# Patient Record
Sex: MALE | Race: WHITE | NOT HISPANIC OR LATINO
[De-identification: names, ages, dates, MRNs, and addresses within clinical notes are randomized per-mention and may not be internally consistent; named-entity substitution may affect disease eponyms.]

---
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
## 2020-01-02 ENCOUNTER — OTHER (OUTPATIENT)
Age: 20
End: 2020-01-02

## 2020-01-03 ENCOUNTER — LABORATORY RESULT (OUTPATIENT)
Age: 20
End: 2020-01-03

## 2020-01-07 LAB
25(OH)D3 SERPL-MCNC: 29 NG/ML
APPEARANCE: CLEAR
BASOPHILS # BLD AUTO: 0.06 K/UL
BASOPHILS NFR BLD AUTO: 0.8 %
BILIRUBIN URINE: NEGATIVE
BLOOD URINE: ABNORMAL
CHOLEST SERPL-MCNC: 120 MG/DL
COLOR: NORMAL
EOSINOPHIL # BLD AUTO: 0.31 K/UL
EOSINOPHIL NFR BLD AUTO: 3.9 %
GLUCOSE QUALITATIVE U: NEGATIVE
HCT VFR BLD CALC: 44.8 %
HGB BLD-MCNC: 15.1 G/DL
IMM GRANULOCYTES NFR BLD AUTO: 0.4 %
KETONES URINE: NEGATIVE
LEUKOCYTE ESTERASE URINE: NEGATIVE
LYMPHOCYTES # BLD AUTO: 3.32 K/UL
LYMPHOCYTES NFR BLD AUTO: 41.9 %
MAN DIFF?: NORMAL
MCHC RBC-ENTMCNC: 31 PG
MCHC RBC-ENTMCNC: 33.7 GM/DL
MCV RBC AUTO: 92 FL
MONOCYTES # BLD AUTO: 0.67 K/UL
MONOCYTES NFR BLD AUTO: 8.4 %
NEUTROPHILS # BLD AUTO: 3.54 K/UL
NEUTROPHILS NFR BLD AUTO: 44.6 %
NITRITE URINE: NEGATIVE
PH URINE: 6
PLATELET # BLD AUTO: 213 K/UL
PROTEIN URINE: NEGATIVE
RBC # BLD: 4.87 M/UL
RBC # FLD: 13.1 %
SPECIFIC GRAVITY URINE: 1.02
UROBILINOGEN URINE: NORMAL
WBC # FLD AUTO: 7.93 K/UL

## 2020-08-12 ENCOUNTER — APPOINTMENT (OUTPATIENT)
Dept: PEDIATRICS | Facility: CLINIC | Age: 20
End: 2020-08-12
Payer: COMMERCIAL

## 2020-08-12 VITALS
HEIGHT: 70.5 IN | TEMPERATURE: 207.86 F | WEIGHT: 188.13 LBS | DIASTOLIC BLOOD PRESSURE: 77 MMHG | OXYGEN SATURATION: 98 % | HEART RATE: 67 BPM | BODY MASS INDEX: 26.63 KG/M2 | SYSTOLIC BLOOD PRESSURE: 121 MMHG

## 2020-08-12 DIAGNOSIS — Z87.898 PERSONAL HISTORY OF OTHER SPECIFIED CONDITIONS: ICD-10-CM

## 2020-08-12 DIAGNOSIS — E56.9 VITAMIN DEFICIENCY, UNSPECIFIED: ICD-10-CM

## 2020-08-12 DIAGNOSIS — Z87.09 PERSONAL HISTORY OF OTHER DISEASES OF THE RESPIRATORY SYSTEM: ICD-10-CM

## 2020-08-12 DIAGNOSIS — Z87.798 PERSONAL HISTORY OF OTHER (CORRECTED) CONGENITAL MALFORMATIONS: ICD-10-CM

## 2020-08-12 DIAGNOSIS — Z87.448 PERSONAL HISTORY OF OTHER DISEASES OF URINARY SYSTEM: ICD-10-CM

## 2020-08-12 PROCEDURE — 96127 BRIEF EMOTIONAL/BEHAV ASSMT: CPT

## 2020-08-12 PROCEDURE — 96160 PT-FOCUSED HLTH RISK ASSMT: CPT

## 2020-08-12 PROCEDURE — 99395 PREV VISIT EST AGE 18-39: CPT

## 2020-08-12 NOTE — HISTORY OF PRESENT ILLNESS
[Up to date] : Up to date [Eats meals with family] : eats meals with family [Has family members/adults to turn to for help] : has family members/adults to turn to for help [Is permitted and is able to make independent decisions] : Is permitted and is able to make independent decisions [Grade: ____] : Grade: [unfilled] [Normal Performance] : normal performance [Normal Behavior/Attention] : normal behavior/attention [Normal Homework] : normal homework [Eats regular meals including adequate fruits and vegetables] : eats regular meals including adequate fruits and vegetables [Calcium source] : calcium source [Drinks non-sweetened liquids] : drinks non-sweetened liquids  [At least 1 hour of physical activity a day] : at least 1 hour of physical activity a day [Has friends] : has friends [Screen time (except homework) less than 2 hours a day] : screen time (except homework) less than 2 hours a day [Has interests/participates in community activities/volunteers] : has interests/participates in community activities/volunteers. [Exposure to electronic nicotine delivery system] : exposure to electronic nicotine delivery system [Exposure to tobacco] : exposure to tobacco [Exposure to drugs] : exposure to drugs [Drinks alcohol] : drinks alcohol [Exposure to alcohol] : exposure to alcohol [Uses safety belts/safety equipment] : uses safety belts/safety equipment  [No] : No cigarette smoke exposure [Has peer relationships free of violence] : has peer relationships free of violence [Has ways to cope with stress] : has ways to cope with stress [Displays self-confidence] : displays self-confidence [With Teen] : teen [Sleep Concerns] : no sleep concerns [Has concerns about body or appearance] : does not have concerns about body or appearance [Uses electronic nicotine delivery system] : does not use electronic nicotine delivery system [Uses tobacco] : does not use tobacco [Impaired/distracted driving] : no impaired/distracted driving [Uses drugs] : does not use drugs  [Always] : Condom use: always [Yes] : Patient has had sexual intercourse. 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

## 2020-08-12 NOTE — PHYSICAL EXAM
[Alert] : alert [No Acute Distress] : no acute distress [Normocephalic] : normocephalic [Clear tympanic membranes with bony landmarks and light reflex present bilaterally] : clear tympanic membranes with bony landmarks and light reflex present bilaterally  [EOMI Bilateral] : EOMI bilateral [Pink Nasal Mucosa] : pink nasal mucosa [Nonerythematous Oropharynx] : nonerythematous oropharynx [Clear to Auscultation Bilaterally] : clear to auscultation bilaterally [Supple, full passive range of motion] : supple, full passive range of motion [No Palpable Masses] : no palpable masses [Normal S1, S2 audible] : normal S1, S2 audible [Regular Rate and Rhythm] : regular rate and rhythm [No Murmurs] : no murmurs [+2 Femoral Pulses] : +2 femoral pulses [Soft] : soft [NonTender] : non tender [Non Distended] : non distended [No Hepatomegaly] : no hepatomegaly [No Splenomegaly] : no splenomegaly [Normoactive Bowel Sounds] : normoactive bowel sounds [No Abnormal Lymph Nodes Palpated] : no abnormal lymph nodes palpated [No Gait Asymmetry] : no gait asymmetry [Normal Muscle Tone] : normal muscle tone [No pain or deformities with palpation of bone, muscles, joints] : no pain or deformities with palpation of bone, muscles, joints [Straight] : straight [Cranial Nerves Grossly Intact] : cranial nerves grossly intact [+2 Patella DTR] : +2 patella DTR [No Rash or Lesions] : no rash or lesions

## 2020-08-12 NOTE — DISCUSSION/SUMMARY
[FreeTextEntry1] : Discussed at length 20- 25 minutes the dangers of alcohol drugs and weed. Tobacco is a major health issue and E-cigarettes are no better nor is vapeing. \par He understands that alcohol and weed are the gate way drugs and many that start in Middle School or High School with alcohol and weed end up trying other substances. We discussed sex and the importance of protecting from STDs and unwanted pregnancies with condoms. Although the best safeguard is abstinence.\par Questions were answered re:STDs drugs and alcohol. I advised that addiction is a disease and that it runs in families and if it is in the Family history one must be especially cautious about any alcohol drugs or high risk behavior\par He does cycle with Creatine and when he is off he notices that he drops 6 - 8 lbs I mentioned that Creatine will add water to the muscle but not strength\par

## 2020-09-03 LAB
25(OH)D3 SERPL-MCNC: 63.3 NG/ML
APPEARANCE: CLEAR
BASOPHILS # BLD AUTO: 0.07 K/UL
BASOPHILS NFR BLD AUTO: 0.8 %
BILIRUBIN URINE: NEGATIVE
BLOOD URINE: ABNORMAL
CHOLEST SERPL-MCNC: 112 MG/DL
COLOR: YELLOW
EOSINOPHIL # BLD AUTO: 0.24 K/UL
EOSINOPHIL NFR BLD AUTO: 2.8 %
GLUCOSE QUALITATIVE U: NEGATIVE
HCT VFR BLD CALC: 49.7 %
HGB BLD-MCNC: 16.2 G/DL
IMM GRANULOCYTES NFR BLD AUTO: 0.4 %
KETONES URINE: NEGATIVE
LEUKOCYTE ESTERASE URINE: NEGATIVE
LYMPHOCYTES # BLD AUTO: 2.62 K/UL
LYMPHOCYTES NFR BLD AUTO: 31 %
MAN DIFF?: NORMAL
MCHC RBC-ENTMCNC: 29.9 PG
MCHC RBC-ENTMCNC: 32.6 GM/DL
MCV RBC AUTO: 91.7 FL
MONOCYTES # BLD AUTO: 0.67 K/UL
MONOCYTES NFR BLD AUTO: 7.9 %
NEUTROPHILS # BLD AUTO: 4.83 K/UL
NEUTROPHILS NFR BLD AUTO: 57.1 %
NITRITE URINE: NEGATIVE
PH URINE: 6
PLATELET # BLD AUTO: 243 K/UL
PROTEIN URINE: NEGATIVE
RBC # BLD: 5.42 M/UL
RBC # FLD: 12.2 %
SARS-COV-2 IGG SERPL IA-ACNC: 0.09 INDEX
SARS-COV-2 IGG SERPL QL IA: NEGATIVE
SPECIFIC GRAVITY URINE: 1.03
UROBILINOGEN URINE: NORMAL
WBC # FLD AUTO: 8.46 K/UL

## 2021-07-07 ENCOUNTER — APPOINTMENT (OUTPATIENT)
Dept: PEDIATRICS | Facility: CLINIC | Age: 21
End: 2021-07-07
Payer: COMMERCIAL

## 2021-07-21 ENCOUNTER — APPOINTMENT (OUTPATIENT)
Dept: PEDIATRICS | Facility: CLINIC | Age: 21
End: 2021-07-21
Payer: COMMERCIAL

## 2021-07-21 VITALS
HEART RATE: 57 BPM | WEIGHT: 201.25 LBS | SYSTOLIC BLOOD PRESSURE: 122 MMHG | HEIGHT: 70.5 IN | DIASTOLIC BLOOD PRESSURE: 75 MMHG | TEMPERATURE: 98.2 F | BODY MASS INDEX: 28.49 KG/M2

## 2021-07-21 DIAGNOSIS — Z00.00 ENCOUNTER FOR GENERAL ADULT MEDICAL EXAMINATION W/OUT ABNORMAL FINDINGS: ICD-10-CM

## 2021-07-21 PROCEDURE — 99072 ADDL SUPL MATRL&STAF TM PHE: CPT

## 2021-07-21 PROCEDURE — 96127 BRIEF EMOTIONAL/BEHAV ASSMT: CPT

## 2021-07-21 PROCEDURE — 96160 PT-FOCUSED HLTH RISK ASSMT: CPT | Mod: 59

## 2021-07-21 PROCEDURE — 99395 PREV VISIT EST AGE 18-39: CPT

## 2021-07-21 NOTE — DISCUSSION/SUMMARY
[FreeTextEntry1] : Discussed at length 20- 25 minutes the dangers of alcohol drugs and weed. Tobacco is a major health issue and E-cigarettes are no better nor is vapeing. \par He understands that alcohol and weed are the gate way drugs and many that start in Middle School or High School with alcohol and weed end up trying other substances. We discussed sex and the importance of protecting from STDs and unwanted pregnancies with condoms. Although the best safeguard is abstinence.\par Questions were answered re:STDs drugs and alcohol. 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

## 2021-07-21 NOTE — HISTORY OF PRESENT ILLNESS
[Up to date] : Up to date [Eats meals with family] : eats meals with family [Has family members/adults to turn to for help] : has family members/adults to turn to for help [Is permitted and is able to make independent decisions] : Is permitted and is able to make independent decisions [Grade: ____] : Grade: [unfilled] [Normal Performance] : normal performance [Normal Behavior/Attention] : normal behavior/attention [Normal Homework] : normal homework [Eats regular meals including adequate fruits and vegetables] : eats regular meals including adequate fruits and vegetables [Drinks non-sweetened liquids] : drinks non-sweetened liquids  [Calcium source] : calcium source [Exposure to electronic nicotine delivery system] : exposure to electronic nicotine delivery system [Exposure to tobacco] : exposure to tobacco [Exposure to drugs] : exposure to drugs [Drinks alcohol] : drinks alcohol [Exposure to alcohol] : exposure to alcohol [No] : No cigarette smoke exposure [Uses safety belts/safety equipment] : uses safety belts/safety equipment  [Has peer relationships free of violence] : has peer relationships free of violence [Yes] : Patient has had sexual intercourse. [Has ways to cope with stress] : has ways to cope with stress [Displays self-confidence] : displays self-confidence [With Teen] : teen [Sleep Concerns] : no sleep concerns [Has concerns about body or appearance] : does not have concerns about body or appearance [Has friends] : has friends [At least 1 hour of physical activity a day] : at least 1 hour of physical activity a day [Screen time (except homework) less than 2 hours a day] : screen time (except homework) less than 2 hours a day [Has interests/participates in community activities/volunteers] : has interests/participates in community activities/volunteers. [Uses electronic nicotine delivery system] : does not use electronic nicotine delivery system [Uses tobacco] : does not use tobacco [Uses drugs] : does not use drugs  [Impaired/distracted driving] : no impaired/distracted driving [Age of 1st Sexual Gilbert: ____] : Age of 1st sexual intercourse: [unfilled]  [History of Sexual Abuse] : no history of sexual abuse [History of STI] : no history of STI [Always] : Condom use: always [Has problems with sleep] : does not have problems with sleep [Gets depressed, anxious, or irritable/has mood swings] : does not get depressed, anxious, or irritable/has mood swings [Has thought about hurting self or considered suicide] : has not thought about hurting self or considered suicide [FreeTextEntry7] : entering Senior year at [employer]  [de-identified] : had done well at school 3.96 GPA Last year [FreeTextEntry3] : 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 \par He has done well and may stay for his Master's Degree in Mechanical Engineering \par He lacerated tendons on his left hand 2 days ago and had some surgery by Dr. Vuong \par Presently in a splint and immobility for 4 -6 weeks \par He cut the tendo with a chain saw as he was doing yard work.\par He did test positive for COVID February 14 th 2021 while at school

## 2021-07-21 NOTE — PHYSICAL EXAM

## 2021-11-26 ENCOUNTER — APPOINTMENT (OUTPATIENT)
Dept: PEDIATRICS | Facility: CLINIC | Age: 21
End: 2021-11-26
Payer: COMMERCIAL

## 2021-11-26 VITALS — TEMPERATURE: 97.7 F

## 2021-11-26 DIAGNOSIS — Z00.00 ENCOUNTER FOR GENERAL ADULT MEDICAL EXAMINATION W/OUT ABNORMAL FINDINGS: ICD-10-CM

## 2021-11-26 DIAGNOSIS — J45.901 UNSPECIFIED ASTHMA WITH (ACUTE) EXACERBATION: ICD-10-CM

## 2021-11-26 DIAGNOSIS — Z11.1 ENCOUNTER FOR SCREENING FOR RESPIRATORY TUBERCULOSIS: ICD-10-CM

## 2021-11-26 DIAGNOSIS — Z20.822 CONTACT WITH AND (SUSPECTED) EXPOSURE TO COVID-19: ICD-10-CM

## 2021-11-26 DIAGNOSIS — E55.9 VITAMIN D DEFICIENCY, UNSPECIFIED: ICD-10-CM

## 2021-11-26 LAB — S PYO AG SPEC QL IA: NEGATIVE

## 2021-11-26 PROCEDURE — 99214 OFFICE O/P EST MOD 30 MIN: CPT

## 2021-11-26 PROCEDURE — 87880 STREP A ASSAY W/OPTIC: CPT | Mod: QW

## 2021-11-26 RX ORDER — FLUTICASONE PROPIONATE 50 UG/1
50 SPRAY, METERED NASAL DAILY
Qty: 1 | Refills: 2 | Status: ACTIVE | COMMUNITY
Start: 2021-11-26 | End: 1900-01-01

## 2021-11-26 NOTE — HISTORY OF PRESENT ILLNESS
[de-identified] : Cough [FreeTextEntry6] : Started 2 weeks ago with hacky and phlegmy cough that he feels in his chest.  Has some SOB when exerting himself and big changes in temperature.  Got checked at school- [place] - 1 week ago and felt it was more PN.  Afebrile.  NL appetite.  Restless sleep 2* to coughing. Has had stuffy and runny nose.  No HAS/ ear pain or pressure.  Had sore throat about 1 week ago.  Occ CP and SOB when coughs a lot.  No SA/N/V/D/C/loose stools.  No one else sick noted at school around him. Tested for COVID last week negative.

## 2021-11-26 NOTE — DISCUSSION/SUMMARY
[FreeTextEntry1] : 20 y/o M with Asthmatic bronchitis/Cough with bronchospasm/Pharyngitis/Nasal congestion-\par Quick Strep negative\par T/C sent\par COVID PCR sent\par Flonase nasal spray 2 sprays each nostril 1x/day \par Medrol dose pack as directed with food\par Zithromax as directed with food\par Albuterol Inhaler 2 puffs every 4-6 hours as needed.\par Increase clear fluids/ Steam/ Ices/Smoothies/Soups/Probiotics/Tylenol and/or Motrin as needed\par Ques addressed\par Abad verbalizes understanding.\par Check back any concerns/questions.\par Time spent patient/chart - 30 mins.\par

## 2021-11-26 NOTE — PHYSICAL EXAM
[No Acute Distress] : no acute distress [Alert] : alert [Normocephalic] : normocephalic [EOMI] : EOMI [Clear TM bilaterally] : clear tympanic membranes bilaterally [Clear Rhinorrhea] : clear rhinorrhea [Erythematous Oropharynx] : erythematous oropharynx [Supple] : supple [Regular Rate and Rhythm] : regular rate and rhythm [Soft] : soft [NonTender] : non tender [Moves All Extremities x 4] : moves all extremities x4 [Normotonic] : normotonic [Warm] : warm [FreeTextEntry7] : Essentially clear with mild wheeze on forced expiration

## 2021-11-26 NOTE — REVIEW OF SYSTEMS
[Difficulty with Sleep] : difficulty with sleep [Nasal Discharge] : nasal discharge [Nasal Congestion] : nasal congestion [Sore Throat] : sore throat [Intolerance to Exercise] : intolerance to exercise [Cough] : cough [Shortness of Breath] : shortness of breath [Negative] : Skin

## 2021-11-28 LAB
BACTERIA THROAT CULT: NORMAL
SARS-COV-2 N GENE NPH QL NAA+PROBE: NOT DETECTED

## 2021-12-29 LAB
ALBUMIN SERPL ELPH-MCNC: 4.8 G/DL
ALP BLD-CCNC: 67 U/L
ALT SERPL-CCNC: 61 U/L
ANION GAP SERPL CALC-SCNC: 13 MMOL/L
APPEARANCE: CLEAR
AST SERPL-CCNC: 43 U/L
BASOPHILS # BLD AUTO: 0.08 K/UL
BASOPHILS NFR BLD AUTO: 0.9 %
BILIRUB SERPL-MCNC: 0.8 MG/DL
BILIRUBIN URINE: NEGATIVE
BLOOD URINE: ABNORMAL
BUN SERPL-MCNC: 24 MG/DL
CALCIUM SERPL-MCNC: 9.6 MG/DL
CHLORIDE SERPL-SCNC: 102 MMOL/L
CHOLEST SERPL-MCNC: 134 MG/DL
CO2 SERPL-SCNC: 24 MMOL/L
COLOR: NORMAL
CREAT SERPL-MCNC: 1.24 MG/DL
EOSINOPHIL # BLD AUTO: 0.36 K/UL
EOSINOPHIL NFR BLD AUTO: 4.3 %
GLUCOSE QUALITATIVE U: NEGATIVE
GLUCOSE SERPL-MCNC: 103 MG/DL
HCT VFR BLD CALC: 50.1 %
HGB BLD-MCNC: 16.5 G/DL
IMM GRANULOCYTES NFR BLD AUTO: 0.6 %
KETONES URINE: NEGATIVE
LEUKOCYTE ESTERASE URINE: NEGATIVE
LYMPHOCYTES # BLD AUTO: 3.62 K/UL
LYMPHOCYTES NFR BLD AUTO: 42.9 %
MAN DIFF?: NORMAL
MCHC RBC-ENTMCNC: 29.6 PG
MCHC RBC-ENTMCNC: 32.9 GM/DL
MCV RBC AUTO: 89.9 FL
MONOCYTES # BLD AUTO: 0.71 K/UL
MONOCYTES NFR BLD AUTO: 8.4 %
NEUTROPHILS # BLD AUTO: 3.62 K/UL
NEUTROPHILS NFR BLD AUTO: 42.9 %
NITRITE URINE: NEGATIVE
PH URINE: 5.5
PLATELET # BLD AUTO: 248 K/UL
POTASSIUM SERPL-SCNC: 4.7 MMOL/L
PROT SERPL-MCNC: 7.2 G/DL
PROTEIN URINE: NEGATIVE
RBC # BLD: 5.57 M/UL
RBC # FLD: 12.6 %
SODIUM SERPL-SCNC: 139 MMOL/L
SPECIFIC GRAVITY URINE: 1.03
UROBILINOGEN URINE: NORMAL
WBC # FLD AUTO: 8.44 K/UL

## 2022-01-05 ENCOUNTER — APPOINTMENT (OUTPATIENT)
Dept: PEDIATRICS | Facility: CLINIC | Age: 22
End: 2022-01-05
Payer: COMMERCIAL

## 2022-01-05 PROCEDURE — 99214 OFFICE O/P EST MOD 30 MIN: CPT

## 2022-01-05 RX ORDER — AZITHROMYCIN 250 MG/1
250 TABLET, FILM COATED ORAL
Qty: 1 | Refills: 0 | Status: DISCONTINUED | COMMUNITY
Start: 2021-11-26 | End: 2022-01-05

## 2022-01-05 NOTE — REVIEW OF SYSTEMS
[Negative] : Genitourinary [Difficulty with Sleep] : difficulty with sleep [Headache] : headache [Nasal Discharge] : nasal discharge [Nasal Congestion] : nasal congestion [Sinus Pressure] : sinus pressure [Cough] : cough [Congestion] : congestion [Fever] : no fever [Malaise] : no malaise [Ear Pain] : no ear pain [Sore Throat] : no sore throat [Tachypnea] : not tachypneic [Wheezing] : no wheezing [Shortness of Breath] : no shortness of breath [Appetite Changes] : no appetite changes [Vomiting] : no vomiting [Diarrhea] : no diarrhea [Abdominal Pain] : no abdominal pain [Weakness] : no weakness [Myalgia] : no myalgia [Rash] : no rash

## 2022-01-05 NOTE — PHYSICAL EXAM
[NL] : warm [Mucoid Discharge] : mucoid discharge [de-identified] : PND [FreeTextEntry7] : no rale no rhonchi and no wheezing

## 2022-01-05 NOTE — HISTORY OF PRESENT ILLNESS
[FreeTextEntry6] : 20 yo male comes in with a persistent  cough He was seen 11/26 with cough and congestion He was treated with Azithromycin and Prednisone He had been coughing x 2 weeks prior to his visit. He did improve as he went back to school but as he had finals, he got run down and developed another URI with a cough The cough has gotten worse and continue both day and night. He has had no fever but does get a headache on occasion and he does feel pressure over his frontal and paranasal sinuses.\par He is eating well and sleeping well There has been no nausea no vomiting and no diarrhea.\par He had a negative test for Covid 2 days ago

## 2022-01-05 NOTE — DISCUSSION/SUMMARY
[FreeTextEntry1] : 22 yo male comes in with cough and congestion x 3 weeks. H is now developing sinus pessure.\par Advise steam\par Mucinex\par Saline Nasal Spray\par Flonase\par Cefzil 500 mg BID \par (I did caution him about cross reactivity with penicillin allergy)

## 2022-01-18 ENCOUNTER — APPOINTMENT (OUTPATIENT)
Dept: PEDIATRICS | Facility: CLINIC | Age: 22
End: 2022-01-18

## 2022-01-18 DIAGNOSIS — J98.01 ACUTE BRONCHOSPASM: ICD-10-CM

## 2022-01-18 DIAGNOSIS — Z87.09 PERSONAL HISTORY OF OTHER DISEASES OF THE RESPIRATORY SYSTEM: ICD-10-CM

## 2022-01-18 DIAGNOSIS — Z87.898 PERSONAL HISTORY OF OTHER SPECIFIED CONDITIONS: ICD-10-CM

## 2022-01-18 RX ORDER — METHYLPREDNISOLONE 4 MG/1
4 TABLET ORAL
Qty: 21 | Refills: 0 | Status: COMPLETED | COMMUNITY
Start: 2021-11-26 | End: 2022-01-18

## 2022-01-18 RX ORDER — CEFPROZIL 500 MG/1
500 TABLET ORAL
Qty: 20 | Refills: 0 | Status: COMPLETED | COMMUNITY
Start: 2022-01-05 | End: 2022-01-18

## 2023-02-10 NOTE — ASU PATIENT PROFILE, PEDIATRIC - TEACHING/LEARNING LEARNING PREFERENCES PEDS
Cognitive Behavioral Coping Skills verbal instruction Cognitive Behavioral Coping Skills Cognitive Behavioral Coping Skills Cognitive Behavioral Coping Skills Cognitive Behavioral Coping Skills

## 2024-02-28 NOTE — H&P PST PEDIATRIC - GENERAL
Start your blood pressure medication with food once you arrive home.    Eat bland foods over the next 4 days.  Cooked, canned, or frozen vegetables  Potatoes  Canned fruit as well as apple sauce, bananas, and melons  Breads, crackers, and pasta made with refined white flour  Refined, hot cereals, such as Cream of Wheat (farina cereal)    Cough: Proair inhaler, Mucinex DM, salt/water gargles then spit over then next 4-5 days.    Sleep upright in comfortable position to avoid neck/back pain.    Wheeze: Proair inhaler & Prednisone as tolerated.     Sore throat: Tylenol as needed (follow instructions).    Pedialyte or liquid IV next 2 days (follow instructions).    Drink 7-8 cups water daily    Change toothbrush in 3 nights.    Rest and stay home over next few days.    Wear face mask at work over next 10 days to protect yourself from worsening illness.    911/ER    Call 911  Call 911 if any of these occur:   Coughing up blood  Weakness, drowsiness, worst headache ever, or stiff neck that get worse  Trouble breathing, wheezing, or pain with breathing  Lips or skin looks blue, purple, or gray in color  Hives over body  Feeling of doom   negative